# Patient Record
Sex: OTHER/UNKNOWN | URBAN - METROPOLITAN AREA
[De-identification: names, ages, dates, MRNs, and addresses within clinical notes are randomized per-mention and may not be internally consistent; named-entity substitution may affect disease eponyms.]

---

## 2019-10-03 ENCOUNTER — APPOINTMENT (OUTPATIENT)
Dept: URBAN - METROPOLITAN AREA CLINIC 290 | Age: 49
Setting detail: DERMATOLOGY
End: 2019-10-03

## 2019-10-03 DIAGNOSIS — L20.89 OTHER ATOPIC DERMATITIS: ICD-10-CM

## 2019-10-03 PROCEDURE — 99202 OFFICE O/P NEW SF 15 MIN: CPT

## 2019-10-03 PROCEDURE — OTHER TREATMENT REGIMEN: OTHER

## 2019-10-03 PROCEDURE — OTHER COUNSELING: OTHER

## 2019-10-03 PROCEDURE — OTHER PRESCRIPTION: OTHER

## 2019-10-03 PROCEDURE — OTHER MIPS QUALITY: OTHER

## 2019-10-03 RX ORDER — CRISABOROLE 20 MG/G
OINTMENT TOPICAL
Qty: 60 | Refills: 0 | Status: ERX | COMMUNITY
Start: 2019-10-03

## 2019-10-03 RX ORDER — HALOBETASOL PROPIONATE 0.5 MG/G
OINTMENT TOPICAL
Qty: 50 | Refills: 0 | Status: ERX | COMMUNITY
Start: 2019-10-03

## 2019-10-03 ASSESSMENT — LOCATION SIMPLE DESCRIPTION DERM
LOCATION SIMPLE: LEFT PLANTAR SURFACE
LOCATION SIMPLE: LEFT FOOT
LOCATION SIMPLE: RIGHT PLANTAR SURFACE
LOCATION SIMPLE: RIGHT HAND
LOCATION SIMPLE: LEFT HAND
LOCATION SIMPLE: RIGHT FOOT

## 2019-10-03 ASSESSMENT — LOCATION DETAILED DESCRIPTION DERM
LOCATION DETAILED: RIGHT MEDIAL PLANTAR HEEL
LOCATION DETAILED: LEFT LATERAL DORSAL FOOT
LOCATION DETAILED: RIGHT ULNAR PALM
LOCATION DETAILED: LEFT MEDIAL PLANTAR HEEL
LOCATION DETAILED: RIGHT DORSAL FOOT
LOCATION DETAILED: LEFT ULNAR PALM

## 2019-10-03 ASSESSMENT — LOCATION ZONE DERM
LOCATION ZONE: FEET
LOCATION ZONE: HAND

## 2019-10-03 NOTE — PROCEDURE: COUNSELING
Detail Level: Zone
Patient Specific Counseling (Will Not Stick From Patient To Patient): Patient was administered the ADCT test which scored at 9.  Photo of test taken and placed in attachments

## 2019-10-03 NOTE — PROCEDURE: TREATMENT REGIMEN
Plan: Apply halobetasol to affected areas twice daily for two weeks then once daily as needed\\nApply Eucrisa once daily starting after the halobetasol is stopped\\nUse Urea cream on feet once daily for roughness
Detail Level: Zone